# Patient Record
Sex: MALE | Race: WHITE | Employment: FULL TIME | URBAN - METROPOLITAN AREA
[De-identification: names, ages, dates, MRNs, and addresses within clinical notes are randomized per-mention and may not be internally consistent; named-entity substitution may affect disease eponyms.]

---

## 2021-02-23 ENCOUNTER — HOSPITAL ENCOUNTER (EMERGENCY)
Age: 36
Discharge: HOME | End: 2021-02-23
Payer: COMMERCIAL

## 2021-02-23 ENCOUNTER — APPOINTMENT (OUTPATIENT)
Dept: CT IMAGING | Age: 36
End: 2021-02-23
Payer: COMMERCIAL

## 2021-02-23 ENCOUNTER — APPOINTMENT (OUTPATIENT)
Dept: GENERAL RADIOLOGY | Age: 36
End: 2021-02-23
Payer: COMMERCIAL

## 2021-02-23 VITALS
SYSTOLIC BLOOD PRESSURE: 159 MMHG | DIASTOLIC BLOOD PRESSURE: 98 MMHG | TEMPERATURE: 97.5 F | RESPIRATION RATE: 20 BRPM | OXYGEN SATURATION: 98 % | HEART RATE: 90 BPM

## 2021-02-23 DIAGNOSIS — S69.92XA LEFT WRIST INJURY, INITIAL ENCOUNTER: Primary | ICD-10-CM

## 2021-02-23 PROCEDURE — 73200 CT UPPER EXTREMITY W/O DYE: CPT

## 2021-02-23 PROCEDURE — 73110 X-RAY EXAM OF WRIST: CPT

## 2021-02-23 PROCEDURE — 99282 EMERGENCY DEPT VISIT SF MDM: CPT

## 2021-02-23 RX ORDER — NAPROXEN 500 MG/1
500 TABLET ORAL 2 TIMES DAILY PRN
Qty: 20 TABLET | Refills: 0 | Status: SHIPPED | OUTPATIENT
Start: 2021-02-23

## 2021-02-23 ASSESSMENT — PAIN DESCRIPTION - ORIENTATION: ORIENTATION: LEFT

## 2021-02-23 ASSESSMENT — PAIN DESCRIPTION - DESCRIPTORS: DESCRIPTORS: THROBBING

## 2021-02-23 ASSESSMENT — PAIN DESCRIPTION - LOCATION: LOCATION: WRIST

## 2021-02-23 NOTE — ED PROVIDER NOTES
[address]      Pt Name: Hunter Neff  MRN: [id number]  [place] 1985  Date of evaluation: 2/23/2021  Provider: ILA Morales    The ED Attending Physician was available for consultation but did not see or evaluate this patient. CHIEF COMPLAINT       Chief Complaint   Patient presents with    Wrist Pain     Worker's comp, happened on 2/19/2021       HISTORY OF PRESENT ILLNESS  (Location/Symptom, Timing/Onset, Context/Setting, Quality, Duration, Modifying Factors, Severity.)   Hunter Neff is a 30 y.o. male who presents to the emergency department with a complaint of left wrist pain. He says he had an injury at work 4 days ago, when he was hammering some osito on a wall, something gave way, and his left hand jammed forward, causing wrist pain. He says the pain in his wrist has remained since then, has probably gotten worse. He says it is focused on the dorsal wrist, made worse with flexion and extension movements. Denies any lacerations or bleeding. Denies significant swelling. Denies numbness. Denies any prior history of significant injury, fracture or surgery to the affected area. Reports he does have history of Raynaud's syndrome, does not take regular medication for this. Denies injuries to any other parts of the body or any other relevant medical problems. No other complaints. Nursing Notes were reviewed and I agree. REVIEW OF SYSTEMS    (2-9 systems for level 4, 10 or more for level 5)     Constitutional:  Negative for fever, chills. Respiratory:  Negative for cough, shortness of breath. Cardiovascular:  Negative for chest pain, palpitations. Gastrointestinal:  Negative for nausea, vomiting, abdominal pain. Genitourinary:  Negative for dysuria, hematuria, flank pain, pelvic pain. Musculoskeletal: Positive for left wrist pain. Negative for myalgias, neck pain or stiffness.    Neurological:  Negative for dizziness, focal weakness, numbness. Except as noted above the remainder of the review of systems was reviewed and negative. PAST MEDICAL HISTORY         Diagnosis Date    Pneumonia     Seizure disorder [facility])        SURGICAL HISTORY           Procedure Laterality Date    ANKLE SURGERY Left     KNEE SURGERY Right     TONSILLECTOMY         CURRENT MEDICATIONS       Previous Medications    No medications on file       ALLERGIES     Ultram [tramadol] and Vicodin [hydrocodone-acetaminophen]    FAMILY HISTORY     No family history on file. No family status information on file. SOCIAL HISTORY      reports that he has been smoking. He has a 10.00 pack-year smoking history. He uses smokeless tobacco. He reports current alcohol use. He reports that he does not use drugs. PHYSICAL EXAM    (up to 7 for level 4, 8 or more for level 5)     ED Triage Vitals [02/23/21 0824]   BP Temp Temp src Pulse Resp SpO2 Height Weight   (!) 159/98 97.5 °F (36.4 °C) -- 90 20 98 % -- --       Constitutional:  Appearing well-developed and well-nourished. No distress. HENT:  Normocephalic and atraumatic. Cardiovascular:  Normal rate, regular rhythm, normal heart sounds and intact distal pulses. Pulmonary/Chest:  Effort normal and breath sounds normal. No respiratory distress. Musculoskeletal: Moderate tenderness to direct palpation over the dorsal left wrist at the midline, and positive for snuffbox tenderness. Pain reported with any passive flexion of the left wrist, and pain reported with passive extension greater than approximately 45 degrees. Negative for edema, ecchymosis, laceration or erythema in the left wrist.  2+ radial pulse on the left. Sensation to light touch intact but capillary refill somewhat delayed in the digits of the left upper extremity, with the digits cool to the touch. Neurological:  Oriented to person, place, and time. No cranial nerve deficit observed. Skin:  Skin is warm and dry.  Not diaphoretic. Psychiatric:  Normal mood, affect, behavior, judgment and thought content. DIAGNOSTIC RESULTS     RADIOLOGY:     Interpretation per the Radiologist below, if available at the time of this note:    CT WRIST LEFT WO CONTRAST   Preliminary Result   No acute osseous abnormality. The scaphoid is intact. XR WRIST LEFT (MIN 3 VIEWS)   Final Result   No acute osseus abnormality of the wrist.             LABS:  Labs Reviewed - No data to display    All other labs were within normal range or not returned as of this dictation. EMERGENCY DEPARTMENT COURSE and DIFFERENTIAL DIAGNOSIS/MDM:   Vitals:    Vitals:    02/23/21 0824   BP: (!) 159/98   Pulse: 90   Resp: 20   Temp: 97.5 °F (36.4 °C)   SpO2: 98%       The patient's condition in the ED was good, the patient was afebrile and nontoxic in appearance, and the patient's physical exam was unremarkable other than for the left wrist findings above. Good neurovascular status in the hand. X-ray was negative for any evidence of fracture, but suspicion remains for scaphoid injury. Subsequent CT of the wrist without contrast showed no acute findings, with an intact scaphoid. Patient likely suffered a mild sprain of the wrist, and there was no indication for hospitalization or further workup. He already has a rigid splint for the wrist.  He will be discharged with a prescription for anti-inflammatory medication, instructions to continue using the splint while symptoms last, and referral for orthopedic care to be used if he sees no improvement in symptoms after about a week. The patient verbalized understanding and agreement with this plan of care. The patient was advised to return to the emergency department if symptoms should significantly worsen or if new and concerning symptoms should appear.      I estimate there is LOW risk for FRACTURE, COMPARTMENT SYNDROME, DEEP VENOUS THROMBOSIS, SEPTIC ARTHRITIS, TENDON OR NEUROVASCULAR INJURY, thus I consider the discharge disposition reasonable. PROCEDURES:  None    FINAL IMPRESSION      1.  Left wrist injury, initial encounter          DISPOSITION/PLAN   DISPOSITION Decision To Discharge 02/23/2021 09:38:28 AM      PATIENT REFERRED TO:  [address] [place] [address]  [phone]  Call in 1 week  If no improvement in symptoms, for orthopedic follow-up care      DISCHARGE MEDICATIONS:  New Prescriptions    NAPROXEN (NAPROSYN) 500 MG TABLET    Take 1 tablet by mouth 2 times daily as needed for Pain       (Please note that portions of this note were completed with a voice recognition program.  Efforts were made to edit the dictations but occasionally words are mis-transcribed.)    Severo Melton, [address] [place], [place]  02/23/21 0294

## 2021-02-23 NOTE — ED NOTES
D/C: Order noted for d/c. Pt confirmed d/c paperwork has correct name. Discharge and education instructions reviewed with patient. Teach-back successful. Pt verbalized understanding and signed d/c papers. Pt denied questions at this time. No acute distress noted. Patient instructed to follow-up as noted - return to emergency department if symptoms worsen. Patient verbalized understanding. Discharged per EDMD with discharge instructions. Pt discharged to private vehicle. Patient stable upon departure. Thanked patient for choosing [facility] for care.         Beverley Amezcua, RN  02/23/21 3140

## 2021-03-01 ENCOUNTER — OFFICE VISIT (OUTPATIENT)
Dept: ORTHOPEDIC SURGERY | Age: 36
End: 2021-03-01
Payer: COMMERCIAL

## 2021-03-01 VITALS
HEIGHT: 70 IN | SYSTOLIC BLOOD PRESSURE: 138 MMHG | WEIGHT: 178 LBS | TEMPERATURE: 98.2 F | BODY MASS INDEX: 25.48 KG/M2 | DIASTOLIC BLOOD PRESSURE: 88 MMHG

## 2021-03-01 DIAGNOSIS — S63.502A SPRAIN OF LEFT WRIST, INITIAL ENCOUNTER: Primary | ICD-10-CM

## 2021-03-01 PROCEDURE — 99203 OFFICE O/P NEW LOW 30 MIN: CPT | Performed by: ORTHOPAEDIC SURGERY

## 2021-03-01 NOTE — PROGRESS NOTES
This 30 y.o.  right hand dominant  is seen in referral for the ED at [facility] with a chief complaint of injury to their left wrist, which was injured 12 days ago when a small jackhammer kicked back on him, hyperextending his left wrist.  He noticed mid dorsal wrist pain, mild swelling and a trace of dorsal bruising. He was evaluated at the Hospital 3 days later. Xrays and a CT scan were obtained, both read as negative by the radiologist and the patient was advised to seek specialty evaluation of symptoms persisted longer than one week. There is no history of additional significant injury. Symptoms have improved since the date of injury. The pain assessment has been reviewed and is correct. The patient's social history, past medical history, family history, medications, allergies and review of systems, entered 3/1/21,  have been reviewed, and dated and are recorded in the chart. On physical examination the patient is Height: 5' 10\" (177.8 cm) tall and weighs Weight: 178 lb (80.7 kg). Respirations are 18 per minute. The patient is well nourished, is oriented to time and place, demonstrates appropriate mood and affect as well as normal gait and station. There is no soft tissue swelling present about the left wrist.  There is no discoloration. There is no deformity. Tenderness is present on palpation in the area of the left scapholunate interval dorsally. Range of motion of the wrist is limited only on the left (he has been wearing a brace) and is accompanied by mild pain. Skin is intact, as is distal circulation and sensation. Gross muscle strength is limited only on the left   Hand and wrist joints are stable. There are no subcutaneous nodules or enlarged epitrochlear lymph nodes. Xrays: Review of outside Xrays and CT scan of the left hand and wrist demonstrate no significant abnormality. The radiologist's report concurs. Impression: Low grade dorsal left wrist sprain. The nature of this medical problem is fully discussed with the patient, including all treatment options. All questions are answered. No treatment is required at this time. He is instructed about activity precautions, limited use of the brace and wrist exercises. The usual course of events in the resolution of the symptoms associated with this condition is fully discussed with the patient. As long as they progress as expected, they do not need to return for further follow up. They are, however, urged to call or return if they have questions or concerns.